# Patient Record
Sex: MALE | Race: WHITE | HISPANIC OR LATINO | URBAN - METROPOLITAN AREA
[De-identification: names, ages, dates, MRNs, and addresses within clinical notes are randomized per-mention and may not be internally consistent; named-entity substitution may affect disease eponyms.]

---

## 2017-02-15 ENCOUNTER — IMPORTED ENCOUNTER (OUTPATIENT)
Dept: URBAN - METROPOLITAN AREA CLINIC 50 | Facility: CLINIC | Age: 67
End: 2017-02-15

## 2018-10-30 ENCOUNTER — APPOINTMENT (RX ONLY)
Dept: URBAN - METROPOLITAN AREA CLINIC 86 | Facility: CLINIC | Age: 68
Setting detail: DERMATOLOGY
End: 2018-10-30

## 2018-10-30 DIAGNOSIS — L90.5 SCAR CONDITIONS AND FIBROSIS OF SKIN: ICD-10-CM

## 2018-10-30 DIAGNOSIS — L82.0 INFLAMED SEBORRHEIC KERATOSIS: ICD-10-CM

## 2018-10-30 DIAGNOSIS — L81.4 OTHER MELANIN HYPERPIGMENTATION: ICD-10-CM

## 2018-10-30 DIAGNOSIS — L57.8 OTHER SKIN CHANGES DUE TO CHRONIC EXPOSURE TO NONIONIZING RADIATION: ICD-10-CM

## 2018-10-30 PROBLEM — E13.9 OTHER SPECIFIED DIABETES MELLITUS WITHOUT COMPLICATIONS: Status: ACTIVE | Noted: 2018-10-30

## 2018-10-30 PROBLEM — K75.9 INFLAMMATORY LIVER DISEASE, UNSPECIFIED: Status: ACTIVE | Noted: 2018-10-30

## 2018-10-30 PROBLEM — J45.909 UNSPECIFIED ASTHMA, UNCOMPLICATED: Status: ACTIVE | Noted: 2018-10-30

## 2018-10-30 PROCEDURE — 17110 DESTRUCTION B9 LES UP TO 14: CPT

## 2018-10-30 PROCEDURE — 99202 OFFICE O/P NEW SF 15 MIN: CPT | Mod: 25

## 2018-10-30 PROCEDURE — ? COUNSELING

## 2018-10-30 PROCEDURE — ? LIQUID NITROGEN

## 2018-10-30 ASSESSMENT — LOCATION ZONE DERM
LOCATION ZONE: ARM
LOCATION ZONE: TRUNK
LOCATION ZONE: LEG

## 2018-10-30 ASSESSMENT — LOCATION SIMPLE DESCRIPTION DERM
LOCATION SIMPLE: RIGHT THIGH
LOCATION SIMPLE: RIGHT UPPER BACK
LOCATION SIMPLE: RIGHT FOREARM
LOCATION SIMPLE: LEFT UPPER ARM

## 2018-10-30 ASSESSMENT — LOCATION DETAILED DESCRIPTION DERM
LOCATION DETAILED: RIGHT MEDIAL UPPER BACK
LOCATION DETAILED: LEFT ANTERIOR DISTAL UPPER ARM
LOCATION DETAILED: RIGHT DISTAL DORSAL FOREARM
LOCATION DETAILED: RIGHT ANTERIOR DISTAL THIGH

## 2018-10-30 NOTE — PROCEDURE: LIQUID NITROGEN
Consent: The patient's consent was obtained including but not limited to risks of crusting, scabbing, blistering, scarring, darker or lighter pigmentary change, recurrence, incomplete removal and infection.
Add 52 Modifier (Optional): no
Medical Necessity Clause: This procedure was medically necessary because the lesions that were treated were:
Include Z78.9 (Other Specified Conditions Influencing Health Status) As An Associated Diagnosis?: Yes
Detail Level: Detailed
Post-Care Instructions: I reviewed with the patient in detail post-care instructions. Patient is to wear sunprotection, and avoid picking at any of the treated lesions. Pt may apply Vaseline to crusted or scabbing areas.
Medical Necessity Information: It is in your best interest to select a reason for this procedure from the list below. All of these items fulfill various CMS LCD requirements except the new and changing color options.

## 2021-04-17 ASSESSMENT — VISUAL ACUITY
OD_BAT: 20/100
OD_CC: 20/25+2
OS_OTHER: 20/70. 20/400.
OS_BAT: 20/70
OD_OTHER: 20/100.
OS_CC: 20/20-2

## 2021-04-17 ASSESSMENT — TONOMETRY
OD_IOP_MMHG: 11
OS_IOP_MMHG: 11

## 2022-12-21 ENCOUNTER — NEW PATIENT (OUTPATIENT)
Dept: URBAN - METROPOLITAN AREA CLINIC 50 | Facility: CLINIC | Age: 72
End: 2022-12-21

## 2022-12-21 PROCEDURE — 92014 COMPRE OPH EXAM EST PT 1/>: CPT

## 2022-12-21 PROCEDURE — 66821 AFTER CATARACT LASER SURGERY: CPT

## 2022-12-21 PROCEDURE — 92015 DETERMINE REFRACTIVE STATE: CPT

## 2022-12-21 ASSESSMENT — VISUAL ACUITY
OS_CC: 20/20-1
OU_CC: 20/25
OU_CC: J2@16"
OS_GLARE: 20/60
OS_GLARE: 20/40
OD_SC: 20/30-2
OD_CC: 20/25
OD_GLARE: 20/100
OS_SC: 20/30
OU_SC: J7@16"
OU_SC: 20/25
OD_GLARE: 20/50

## 2022-12-21 ASSESSMENT — KERATOMETRY
OS_AXISANGLE2_DEGREES: 91
OS_K1POWER_DIOPTERS: 44.00
OD_K1POWER_DIOPTERS: 44.00
OD_K2POWER_DIOPTERS: 44.75
OD_AXISANGLE_DEGREES: 148
OS_AXISANGLE_DEGREES: 001
OD_AXISANGLE2_DEGREES: 58
OS_K2POWER_DIOPTERS: 45.50

## 2022-12-21 ASSESSMENT — TONOMETRY
OD_IOP_MMHG: 12
OS_IOP_MMHG: 13

## 2022-12-21 NOTE — PATIENT DISCUSSION
PCO ([id number] [place] [address]): Visually significant PCO present on exam today. Recommend YAG laser capsulotomy to improve vision and decrease glare symptoms. RBAs of procedure discussed. Patient agrees and wishes to proceed.

## 2022-12-21 NOTE — PROCEDURE NOTE: CLINICAL
PROCEDURE NOTE: YAG Capsulotomy OS. Diagnosis: Posterior Capsular Opacification (PCO). Prep: Mydriacil 1% and Phenylephrine 2.5%. Prior to treatment, the risks/benefits/alternatives were discussed. The patient wished to proceed with procedure. Consent was signed. Proparacaine and brominidine were placed into the operative eye after the eye was dilated. Power = 4.2mJ. Number of pulses = 16. Patient tolerated procedure well and there were no complications. Post Laser instructions given. Cam Nelson

## 2022-12-21 NOTE — PATIENT DISCUSSION
Yag Cap OS done today, patient advised there will be floaters after laser as that is normal and will go away. Will have patient schedule for Yag Cap OD in 1-2 weeks.

## 2022-12-23 ENCOUNTER — DIAGNOSTICS ONLY (OUTPATIENT)
Dept: URBAN - METROPOLITAN AREA CLINIC 52 | Facility: CLINIC | Age: 72
End: 2022-12-23

## 2022-12-23 PROCEDURE — 92082 INTERMEDIATE VISUAL FIELD XM: CPT

## 2022-12-23 PROCEDURE — 92285 EXTERNAL OCULAR PHOTOGRAPHY: CPT

## 2022-12-23 ASSESSMENT — KERATOMETRY
OD_AXISANGLE2_DEGREES: 58
OD_AXISANGLE_DEGREES: 148
OD_K1POWER_DIOPTERS: 44.00
OS_K2POWER_DIOPTERS: 45.50
OS_K1POWER_DIOPTERS: 44.00
OS_AXISANGLE_DEGREES: 001
OD_K2POWER_DIOPTERS: 44.75
OS_AXISANGLE2_DEGREES: 91

## 2022-12-23 NOTE — PATIENT DISCUSSION
PCO ([address] [place] [address]): Visually significant PCO present on exam today. Recommend YAG laser capsulotomy to improve vision and decrease glare symptoms. RBAs of procedure discussed. Patient agrees and wishes to proceed.

## 2022-12-28 ENCOUNTER — CLINIC PROCEDURE ONLY (OUTPATIENT)
Dept: URBAN - METROPOLITAN AREA CLINIC 50 | Facility: CLINIC | Age: 72
End: 2022-12-28

## 2022-12-28 PROCEDURE — 66821 AFTER CATARACT LASER SURGERY: CPT

## 2022-12-28 ASSESSMENT — KERATOMETRY
OD_K1POWER_DIOPTERS: 44.00
OS_K1POWER_DIOPTERS: 44.00
OD_K2POWER_DIOPTERS: 44.75
OD_AXISANGLE2_DEGREES: 58
OS_K2POWER_DIOPTERS: 45.50
OS_AXISANGLE2_DEGREES: 91
OD_AXISANGLE_DEGREES: 148
OS_AXISANGLE_DEGREES: 001

## 2022-12-28 ASSESSMENT — VISUAL ACUITY
OS_SC: 20/25-2
OD_SC: 20/25-1
OU_SC: 20/20-1

## 2022-12-28 ASSESSMENT — TONOMETRY
OS_IOP_MMHG: 16
OD_IOP_MMHG: 16

## 2022-12-28 NOTE — PROCEDURE NOTE: CLINICAL
PROCEDURE NOTE: YAG Capsulotomy OD. Diagnosis: Posterior Capsular Opacification (PCO). Prep: Mydriacil 1% and Phenylephrine 2.5%. Prior to treatment, the risks/benefits/alternatives were discussed. The patient wished to proceed with procedure. Consent was signed. Proparacaine and brominidine were placed into the operative eye after the eye was dilated. Power = 3.9mJ. Number of pulses = 18. Patient tolerated procedure well and there were no complications. Post Laser instructions given. Ayanna Canter

## 2022-12-28 NOTE — PATIENT DISCUSSION
Yag Cap OD done today, patient advised there will be floaters after laser as that is normal and will go away. Will have patient schedule for Yag Cap PO in 4 weeks.

## 2022-12-28 NOTE — PATIENT DISCUSSION
PCO ([address] [place] [address]): Visually significant PCO present on exam today. Recommend YAG laser capsulotomy to improve vision and decrease glare symptoms. RBAs of procedure discussed. Patient agrees and wishes to proceed.

## 2023-01-25 ENCOUNTER — COMPREHENSIVE EXAM (OUTPATIENT)
Dept: URBAN - METROPOLITAN AREA CLINIC 50 | Facility: CLINIC | Age: 73
End: 2023-01-25

## 2023-01-25 DIAGNOSIS — H43.811: ICD-10-CM

## 2023-01-25 DIAGNOSIS — H16.223: ICD-10-CM

## 2023-01-25 DIAGNOSIS — Z98.890: ICD-10-CM

## 2023-01-25 PROCEDURE — 92014 COMPRE OPH EXAM EST PT 1/>: CPT

## 2023-01-25 PROCEDURE — 92015 DETERMINE REFRACTIVE STATE: CPT

## 2023-01-25 ASSESSMENT — VISUAL ACUITY
OU_CC: J1
OD_CC: 20/25+2
OS_CC: 20/20
OU_CC: 20/20

## 2023-01-25 ASSESSMENT — KERATOMETRY
OD_K2POWER_DIOPTERS: 44.75
OD_AXISANGLE2_DEGREES: 58
OS_AXISANGLE2_DEGREES: 91
OS_K2POWER_DIOPTERS: 45.50
OD_AXISANGLE_DEGREES: 148
OS_AXISANGLE_DEGREES: 001
OS_K1POWER_DIOPTERS: 44.00
OD_K1POWER_DIOPTERS: 44.00

## 2023-01-25 ASSESSMENT — TONOMETRY
OS_IOP_MMHG: 13
OD_IOP_MMHG: 13

## 2023-01-25 NOTE — PATIENT DISCUSSION
PCO ([address]): Visually significant PCO present on exam today. Recommend YAG laser capsulotomy to improve vision and decrease glare symptoms. RBAs of procedure discussed. Patient agrees and wishes to proceed.

## 2023-05-19 ENCOUNTER — CONSULTATION/EVALUATION (OUTPATIENT)
Dept: URBAN - METROPOLITAN AREA CLINIC 52 | Facility: CLINIC | Age: 73
End: 2023-05-19

## 2023-05-19 DIAGNOSIS — H02.834: ICD-10-CM

## 2023-05-19 DIAGNOSIS — H02.831: ICD-10-CM

## 2023-05-19 PROCEDURE — 92285 EXTERNAL OCULAR PHOTOGRAPHY: CPT

## 2023-05-19 PROCEDURE — 92012 INTRM OPH EXAM EST PATIENT: CPT

## 2023-05-19 ASSESSMENT — KERATOMETRY
OD_AXISANGLE_DEGREES: 148
OS_AXISANGLE_DEGREES: 001
OS_K2POWER_DIOPTERS: 45.50
OS_K1POWER_DIOPTERS: 44.00
OS_AXISANGLE2_DEGREES: 91
OD_K2POWER_DIOPTERS: 44.75
OD_K1POWER_DIOPTERS: 44.00
OD_AXISANGLE2_DEGREES: 58

## 2023-05-19 ASSESSMENT — VISUAL ACUITY
OD_CC: 20/30-1
OS_CC: 20/20

## 2023-05-19 ASSESSMENT — TONOMETRY
OS_IOP_MMHG: 08
OD_IOP_MMHG: 07

## 2023-11-10 ENCOUNTER — PRE-OP/H&P (OUTPATIENT)
Dept: URBAN - METROPOLITAN AREA CLINIC 52 | Facility: CLINIC | Age: 73
End: 2023-11-10

## 2023-11-10 DIAGNOSIS — H02.831: ICD-10-CM

## 2023-11-10 DIAGNOSIS — H02.834: ICD-10-CM

## 2023-11-10 PROCEDURE — PREOP PRE OP VISIT

## 2023-11-10 ASSESSMENT — VISUAL ACUITY
OS_CC: 20/20-1
OD_CC: 20/20-1

## 2023-11-21 ENCOUNTER — SURGERY/PROCEDURE (OUTPATIENT)
Dept: URBAN - METROPOLITAN AREA SURGERY 16 | Facility: SURGERY | Age: 73
End: 2023-11-21

## 2023-11-21 DIAGNOSIS — H02.831: ICD-10-CM

## 2023-11-21 DIAGNOSIS — H02.834: ICD-10-CM

## 2023-11-21 PROCEDURE — 15823 50 BLEPHAROPLASTY, UPPER WITH EXTENSIVE SKIN WEIGHING DOWN LID (BILATERAL)

## 2023-12-01 ENCOUNTER — POST-OP (OUTPATIENT)
Dept: URBAN - METROPOLITAN AREA CLINIC 52 | Facility: CLINIC | Age: 73
End: 2023-12-01

## 2023-12-01 DIAGNOSIS — Z98.890: ICD-10-CM

## 2023-12-01 PROCEDURE — 99024 POSTOP FOLLOW-UP VISIT: CPT

## 2023-12-01 ASSESSMENT — VISUAL ACUITY
OD_CC: 20/25
OS_CC: 20/25

## 2024-02-28 ENCOUNTER — COMPREHENSIVE EXAM (OUTPATIENT)
Dept: URBAN - METROPOLITAN AREA CLINIC 52 | Facility: CLINIC | Age: 74
End: 2024-02-28

## 2024-02-28 DIAGNOSIS — H35.371: ICD-10-CM

## 2024-02-28 DIAGNOSIS — E11.9: ICD-10-CM

## 2024-02-28 DIAGNOSIS — H43.811: ICD-10-CM

## 2024-02-28 PROCEDURE — 99214 OFFICE O/P EST MOD 30 MIN: CPT

## 2024-02-28 PROCEDURE — 92134 CPTRZ OPH DX IMG PST SGM RTA: CPT

## 2024-02-28 ASSESSMENT — TONOMETRY
OS_IOP_MMHG: 10
OD_IOP_MMHG: 12

## 2024-02-28 ASSESSMENT — VISUAL ACUITY
OD_GLARE: 20/25-2
OU_CC: J1+
OS_CC: 20/25
OD_CC: 20/25-1
OD_GLARE: 20/25

## 2025-03-06 ENCOUNTER — COMPREHENSIVE EXAM (OUTPATIENT)
Age: 75
End: 2025-03-06

## 2025-03-06 DIAGNOSIS — H35.371: ICD-10-CM

## 2025-03-06 DIAGNOSIS — E11.9: ICD-10-CM

## 2025-03-06 DIAGNOSIS — H43.813: ICD-10-CM

## 2025-03-06 PROCEDURE — 92134 CPTRZ OPH DX IMG PST SGM RTA: CPT

## 2025-03-06 PROCEDURE — 99214 OFFICE O/P EST MOD 30 MIN: CPT
